# Patient Record
Sex: FEMALE | Race: NATIVE HAWAIIAN OR OTHER PACIFIC ISLANDER | NOT HISPANIC OR LATINO | Employment: FULL TIME | URBAN - NONMETROPOLITAN AREA
[De-identification: names, ages, dates, MRNs, and addresses within clinical notes are randomized per-mention and may not be internally consistent; named-entity substitution may affect disease eponyms.]

---

## 2022-06-10 ENCOUNTER — OFFICE VISIT (OUTPATIENT)
Dept: URGENT CARE | Facility: PHYSICIAN GROUP | Age: 37
End: 2022-06-10

## 2022-06-10 ENCOUNTER — HOSPITAL ENCOUNTER (OUTPATIENT)
Facility: MEDICAL CENTER | Age: 37
End: 2022-06-10
Attending: FAMILY MEDICINE
Payer: COMMERCIAL

## 2022-06-10 VITALS
DIASTOLIC BLOOD PRESSURE: 62 MMHG | HEART RATE: 65 BPM | WEIGHT: 164 LBS | RESPIRATION RATE: 14 BRPM | OXYGEN SATURATION: 99 % | BODY MASS INDEX: 30.96 KG/M2 | SYSTOLIC BLOOD PRESSURE: 118 MMHG | HEIGHT: 61 IN | TEMPERATURE: 98.2 F

## 2022-06-10 DIAGNOSIS — Z11.1 SPECIAL SCREENING EXAMINATION FOR RESPIRATORY TUBERCULOSIS: ICD-10-CM

## 2022-06-10 DIAGNOSIS — Z02.1 PHYSICAL EXAM, PRE-EMPLOYMENT: ICD-10-CM

## 2022-06-10 DIAGNOSIS — Z02.1 PRE-EMPLOYMENT DRUG SCREENING: ICD-10-CM

## 2022-06-10 LAB
AMP AMPHETAMINE: NORMAL
COC COCAINE: NORMAL
INT CON NEG: NORMAL
INT CON POS: NORMAL
MET METHAMPHETAMINES: NORMAL
OPI OPIATES: NORMAL
PCP PHENCYCLIDINE: NORMAL
POC DRUG COMMENT 753798-OCCUPATIONAL HEALTH: NORMAL
THC: NORMAL

## 2022-06-10 PROCEDURE — 86480 TB TEST CELL IMMUN MEASURE: CPT | Performed by: FAMILY MEDICINE

## 2022-06-10 PROCEDURE — 36415 COLL VENOUS BLD VENIPUNCTURE: CPT | Performed by: FAMILY MEDICINE

## 2022-06-10 PROCEDURE — 80305 DRUG TEST PRSMV DIR OPT OBS: CPT | Performed by: FAMILY MEDICINE

## 2022-06-10 PROCEDURE — 8915 PR COMPREHENSIVE PHYSICAL: Performed by: FAMILY MEDICINE

## 2022-06-10 NOTE — PROGRESS NOTES
"Chief Complaint:    Chief Complaint   Patient presents with   • Employment Physical       History of Present Illness:    Here for pre-employment exam.      Past Medical History:    History reviewed. No pertinent past medical history.    Past Surgical History:    History reviewed. No pertinent surgical history.    Social History:    Social History     Socioeconomic History   • Marital status: Single     Spouse name: Not on file   • Number of children: Not on file   • Years of education: Not on file   • Highest education level: Not on file   Occupational History   • Not on file   Tobacco Use   • Smoking status: Not on file   • Smokeless tobacco: Not on file   Substance and Sexual Activity   • Alcohol use: Not on file   • Drug use: Not on file   • Sexual activity: Not on file   Other Topics Concern   • Not on file   Social History Narrative   • Not on file     Social Determinants of Health     Financial Resource Strain: Not on file   Food Insecurity: Not on file   Transportation Needs: Not on file   Physical Activity: Not on file   Stress: Not on file   Social Connections: Not on file   Intimate Partner Violence: Not on file   Housing Stability: Not on file     Family History:    History reviewed. No pertinent family history.    Medications:    No current outpatient medications on file prior to visit.     No current facility-administered medications on file prior to visit.     Allergies:    No Known Allergies      Vitals:    Vitals:    06/10/22 1056   BP: 118/62   Pulse: 65   Resp: 14   Temp: 36.8 °C (98.2 °F)   TempSrc: Temporal   SpO2: 99%   Weight: 74.4 kg (164 lb)   Height: 1.549 m (5' 1\")     Vision: 20/25 right, 20/25 left, 20/20 both, uncorrected.    Able to hear forced whisper in each ear at 5 feet.      Physical Exam:    Constitutional: Vital signs reviewed. Appears well-developed and well-nourished. No acute distress.   Eyes: Sclera white, conjunctivae clear. PERRLA.  ENT: External ears normal. External " auditory canals normal without discharge. TMs translucent and non-bulging. Hearing normal. Nasal mucosa pink. Lips/teeth are normal. Oral mucosa pink and moist. Posterior pharynx: WNL.  Neck: Neck supple.   Cardiovascular: Regular rate and rhythm. No murmur. No edema. No varicosities. Peripheral pulses 2+.  Pulmonary/Chest: Respirations non-labored. Clear to auscultation bilaterally.  Abdomen: Bowel sounds are normal active. Soft, non-distended, and non-tender to palpation. No hepatosplenomegaly. Musculoskeletal: Normal gait. Normal range of motion. No tenderness to palpation. No muscular atrophy or weakness.  Neurological: Alert and oriented to person, place, and time. CN 2-12 intact. Muscle tone normal. Coordination normal. Light touch and sensation normal. Reflexes 2+.  Skin: No rashes or lesions. Warm, dry, normal turgor.  Psychiatric: Normal mood and affect. Behavior is normal. Judgment and thought content normal.         Medical Decision Makin. Special screening examination for respiratory tuberculosis  - Quantiferon Gold Plus TB (4 tube); Future    2. Pre-employment drug screening  - POCT 6 Panel Urine Drug Screen    3. Physical exam, pre-employment      Patient appears to be in a state of good health and is physically able to perform essential functions of the job title above without accommodations.    Form completed.

## 2022-06-13 LAB
GAMMA INTERFERON BACKGROUND BLD IA-ACNC: 1.66 IU/ML
M TB IFN-G BLD-IMP: POSITIVE
M TB IFN-G CD4+ BCKGRND COR BLD-ACNC: 2.58 IU/ML
MITOGEN IGNF BCKGRD COR BLD-ACNC: >10 IU/ML
QFT TB2 - NIL TBQ2: 2.7 IU/ML

## 2022-06-14 ENCOUNTER — APPOINTMENT (OUTPATIENT)
Dept: URGENT CARE | Facility: PHYSICIAN GROUP | Age: 37
End: 2022-06-14
Payer: MEDICAID

## 2022-06-14 DIAGNOSIS — R76.12 POSITIVE QUANTIFERON-TB GOLD TEST: ICD-10-CM

## 2022-06-15 ENCOUNTER — APPOINTMENT (OUTPATIENT)
Dept: RADIOLOGY | Facility: IMAGING CENTER | Age: 37
End: 2022-06-15
Attending: FAMILY MEDICINE

## 2022-06-15 ENCOUNTER — NON-PROVIDER VISIT (OUTPATIENT)
Dept: URGENT CARE | Facility: PHYSICIAN GROUP | Age: 37
End: 2022-06-15
Payer: MEDICAID

## 2022-06-15 ENCOUNTER — APPOINTMENT (OUTPATIENT)
Dept: RADIOLOGY | Facility: IMAGING CENTER | Age: 37
End: 2022-06-15
Attending: FAMILY MEDICINE
Payer: MEDICAID

## 2022-06-15 DIAGNOSIS — R76.12 POSITIVE QUANTIFERON-TB GOLD TEST: ICD-10-CM

## 2022-06-15 PROCEDURE — 71046 X-RAY EXAM CHEST 2 VIEWS: CPT | Mod: TC,FY | Performed by: RADIOLOGY

## 2022-06-29 ENCOUNTER — OFFICE VISIT (OUTPATIENT)
Dept: URGENT CARE | Facility: PHYSICIAN GROUP | Age: 37
End: 2022-06-29
Payer: MEDICAID

## 2022-06-29 VITALS
RESPIRATION RATE: 16 BRPM | BODY MASS INDEX: 30 KG/M2 | TEMPERATURE: 96.8 F | HEART RATE: 76 BPM | HEIGHT: 62 IN | DIASTOLIC BLOOD PRESSURE: 64 MMHG | SYSTOLIC BLOOD PRESSURE: 124 MMHG | OXYGEN SATURATION: 98 % | WEIGHT: 163 LBS

## 2022-06-29 DIAGNOSIS — L98.9 NODULAR LESION ON SURFACE OF SKIN: ICD-10-CM

## 2022-06-29 PROCEDURE — 99214 OFFICE O/P EST MOD 30 MIN: CPT | Performed by: PHYSICIAN ASSISTANT

## 2022-06-29 NOTE — LETTER
June 29, 2022    To Whom It May Concern:         This is confirmation that Julia Anderson attended her scheduled appointment with Christy Cagle P.A.-C. on 6/29/22.  Please excuse patient from work yesterday and today.         If you have any questions please do not hesitate to call me at the phone number listed below.    Sincerely,          Christy Cagle P.A.-C.  [phone]

## 2022-06-29 NOTE — PROGRESS NOTES
"On the rightSubjective:   Julia Anderson is a 36 y.o. female who presents for Breast Mass, Bump (5 lumps on body (3 on breast, 1 on arm and 1 on thigh) has been there 1 month, very painful. Is now effecting pt's sleep. Has not been able to work for 3 days. OTC rx not helping  ), Headache, and Dizziness     Approximately 2 months ago noticed small areas of induation and redness to multiple areas.  She reports to nodular lesions on the left breast, breast, 1 raised tender lesion to the right bicep region and 1 raised tender area to the left medial thigh.  She denies antecedent trauma, scratch, bug bite.  She does not recall any preceding events.  She denies constitutional symptoms but does report pain to these areas.  She has tried heat and ibuprofen without relief.  They sometimes cause her difficulty with sleep.  She denies surrounding redness, fever, chills.  She has no known history of keloid scarring        Medications:  This patient does not have an active medication from one of the medication groupers.    Allergies:             Patient has no known allergies.    Surgical History:       No past surgical history on file.    Past Social Hx:  Julia Anderson       Past Family Hx:   Julia Anderson family history is not on file.       Problem list, medications, and allergies reviewed by myself today in Epic.     Objective:     /64   Pulse 76   Temp 36 °C (96.8 °F) (Temporal)   Resp 16   Ht 1.575 m (5' 2\")   Wt 73.9 kg (163 lb)   SpO2 98%   BMI 29.81 kg/m²     Physical Exam  Vitals and nursing note reviewed.   Constitutional:       General: She is not in acute distress.     Appearance: Normal appearance. She is not ill-appearing or toxic-appearing.   HENT:      Head: Normocephalic.      Right Ear: Tympanic membrane normal.      Left Ear: Tympanic membrane normal.      Nose: Nose normal. No congestion or rhinorrhea.      Mouth/Throat:      Mouth: Mucous membranes are moist.      Pharynx: Oropharynx is clear. No " oropharyngeal exudate or posterior oropharyngeal erythema.   Eyes:      Extraocular Movements: Extraocular movements intact.      Conjunctiva/sclera: Conjunctivae normal.   Cardiovascular:      Rate and Rhythm: Normal rate and regular rhythm.      Pulses: Normal pulses.      Heart sounds: Normal heart sounds.   Pulmonary:      Effort: Pulmonary effort is normal. No tachypnea, respiratory distress or retractions.      Breath sounds: Normal breath sounds. No stridor. No wheezing, rhonchi or rales.   Abdominal:      General: There is no distension.      Palpations: Abdomen is soft.      Tenderness: There is no abdominal tenderness. There is no guarding.   Musculoskeletal:      Cervical back: Normal range of motion.   Lymphadenopathy:      Cervical: No cervical adenopathy.   Skin:     General: Skin is warm.             Comments: Raised, indurated oval lesions noted to the bilateral breast, 2 on the left, 1 on the right.  There is no surrounding cellulitis.  They are mildly tender to touch.  The skin is hyperpigmented.  Similar lesion noted to right medial bicep and left medial thigh, raised, indurated, hyperpigmented.  No surrounding cellulitis.  No signs of infection.   Neurological:      General: No focal deficit present.      Mental Status: She is alert and oriented to person, place, and time.         Assessment/Plan:     Diagnosis and Associated Orders:     1. Nodular lesion on surface of skin  - Referral to Dermatology  - Referral to establish with [facility]CP  - lidocaine (XYLOCAINE) 2% Gel; Apply thin layer to affected area twice daily as needed for pain  Dispense: 30 g; Refill: 0        Comments/MDM:    Patient presents with 5 separate nodular lesions noted to the surface of the skin.  On appearance they appear to be consistent with keloid scar formation however the patient does not recall any antecedent trauma or event leading to their onset.  She has no known history of keloid scar formation.  Discussed  differential diagnosis at length.  Could also be nodular scleroderma.  Recommending referral to dermatology for further evaluation.  May require skin biopsy.  Lidocaine gel prescribed for pain.  Continue ice and heat as needed.  She does not have any constitutional symptoms or lymphadenopathy.  Her vital signs are stable and reassuring.  Does not require higher level of care at this time.  All questions are answered.    I personally reviewed prior external notes and test results pertinent to today's visit.  Red flags discussed as well as indications to present to the Emergency Department.  Supportive care, natural history, differential diagnoses, and indications for immediate follow-up discussed.  Patient expresses understanding and agrees to plan.  Patient denies any other questions or concerns.    Follow-up with the primary care physician for recheck, reevaluation, and consideration of further management.      Please note that this dictation was created using voice recognition software. I have made a reasonable attempt to correct obvious errors, but I expect that there are errors of grammar and possibly content that I did not discover before finalizing the note.    This note was electronically signed by Christy Cagle PA-C

## 2023-04-14 ENCOUNTER — TELEPHONE (OUTPATIENT)
Dept: INTERNAL MEDICINE | Facility: OTHER | Age: 38
End: 2023-04-14
Payer: COMMERCIAL

## 2023-07-20 ENCOUNTER — OFFICE VISIT (OUTPATIENT)
Dept: URGENT CARE | Facility: PHYSICIAN GROUP | Age: 38
End: 2023-07-20
Payer: COMMERCIAL

## 2023-07-20 VITALS
RESPIRATION RATE: 14 BRPM | HEART RATE: 88 BPM | TEMPERATURE: 97.4 F | BODY MASS INDEX: 31.83 KG/M2 | OXYGEN SATURATION: 98 % | WEIGHT: 174 LBS | DIASTOLIC BLOOD PRESSURE: 80 MMHG | SYSTOLIC BLOOD PRESSURE: 110 MMHG

## 2023-07-20 DIAGNOSIS — H81.10 BPPV (BENIGN PAROXYSMAL POSITIONAL VERTIGO), UNSPECIFIED LATERALITY: ICD-10-CM

## 2023-07-20 DIAGNOSIS — R00.2 HEART PALPITATIONS: ICD-10-CM

## 2023-07-20 DIAGNOSIS — R11.0 NAUSEA: ICD-10-CM

## 2023-07-20 PROCEDURE — 3079F DIAST BP 80-89 MM HG: CPT

## 2023-07-20 PROCEDURE — 99214 OFFICE O/P EST MOD 30 MIN: CPT

## 2023-07-20 PROCEDURE — 3074F SYST BP LT 130 MM HG: CPT

## 2023-07-20 PROCEDURE — 93000 ELECTROCARDIOGRAM COMPLETE: CPT

## 2023-07-20 RX ORDER — ONDANSETRON 4 MG/1
4 TABLET, ORALLY DISINTEGRATING ORAL EVERY 6 HOURS PRN
Qty: 15 TABLET | Refills: 0 | Status: SHIPPED | OUTPATIENT
Start: 2023-07-20

## 2023-07-20 RX ORDER — MECLIZINE HCL 12.5 MG/1
12.5-25 TABLET ORAL 3 TIMES DAILY PRN
Qty: 30 TABLET | Refills: 0 | Status: SHIPPED | OUTPATIENT
Start: 2023-07-20 | End: 2023-08-04

## 2023-07-20 RX ORDER — MECLIZINE HCL 12.5 MG/1
12.5-25 TABLET ORAL 3 TIMES DAILY PRN
Qty: 30 TABLET | Refills: 0 | Status: CANCELLED | OUTPATIENT
Start: 2023-07-20 | End: 2023-08-04

## 2023-07-20 RX ORDER — DIAZEPAM 2 MG/1
1-2 TABLET ORAL EVERY 12 HOURS PRN
Qty: 5 TABLET | Refills: 0 | Status: SHIPPED | OUTPATIENT
Start: 2023-07-20 | End: 2023-07-25

## 2023-07-20 ASSESSMENT — ENCOUNTER SYMPTOMS
CHILLS: 0
NAUSEA: 1
ABDOMINAL PAIN: 0
TINGLING: 0
TREMORS: 0
DIAPHORESIS: 0
DIARRHEA: 0
LOSS OF CONSCIOUSNESS: 0
VOMITING: 1
SPEECH CHANGE: 0
HEADACHES: 0
SENSORY CHANGE: 0
SEIZURES: 0
HEARTBURN: 0
FOCAL WEAKNESS: 0
DIZZINESS: 1
FEVER: 0
WEAKNESS: 0
COUGH: 0
WEIGHT LOSS: 0
SINUS PAIN: 0

## 2023-07-20 NOTE — PROGRESS NOTES
Subjective:   Julia Anderson is a very pleasant 37 y.o. female who presents for:    Chief Complaint   Patient presents with    Dizziness       HPI:    The patient presents to the urgent care with complaints of new onset vertigo.  She describes vertigo as feeling as if the room is spinning and describes her symptoms as fairly constant.  Episode onset: three days ago. The problem occurs intermittently and is exacerbated by moving the head from side to side, positional changes from lying to sitting.  Lying down makes the vertigo significantly worse.  Associated symptoms include nausea, bilateral watery eyes, and one episode vomiting due to the spinning sensation. Pertinent negatives include no abdominal pain, syncope, chest pain, headaches, ear fullness, earache, recent URI, new medications, airplane travel, chills, congestion, coughing, diaphoresis, fever, headaches, visual change or weakness.  She endorses that she has never experienced anything like this before.  When she has a particularly severe episode, she endorses mild heart palpitations and anxiety.  Her past medical history is not significant for cardiac conditions, neurological conditions, chronic ear infections.  She does suffer from environmental allergies.Treatments tried: ibuprofen, which she reports is mildly effective       ROS:    Review of Systems   Constitutional:  Negative for chills, diaphoresis, fever, malaise/fatigue and weight loss.   HENT:  Negative for congestion and sinus pain.    Respiratory:  Negative for cough.    Cardiovascular:  Negative for chest pain.   Gastrointestinal:  Positive for nausea and vomiting. Negative for abdominal pain, diarrhea and heartburn.   Neurological:  Positive for dizziness. Negative for tingling, tremors, sensory change, speech change, focal weakness, seizures, loss of consciousness, weakness and headaches.   All other systems reviewed and are negative.      Medications:      Current Outpatient Medications    Medication Sig    lidocaine (XYLOCAINE) 2% Gel Apply thin layer to affected area twice daily as needed for pain (Patient not taking: Reported on 7/20/2023)       Allergies:     No Known Allergies    Problem List:     There is no problem list on file for this patient.      Surgical History:    No past surgical history on file.    Past Social Hx:     Social History     Socioeconomic History    Marital status: Single        Past Family Hx:      No family history on file.    Problem list, medications, and allergies reviewed by myself today in Epic.     Objective:     Vitals:    07/20/23 1245   BP: 110/80   Pulse: 88   Resp: 14   Temp: 36.3 °C (97.4 °F)   SpO2: 98%       Physical Exam  Vitals reviewed.   Constitutional:       General: She is not in acute distress.     Appearance: Normal appearance. She is not ill-appearing, toxic-appearing or diaphoretic.   HENT:      Head: Normocephalic and atraumatic.      Right Ear: Tympanic membrane, ear canal and external ear normal.      Left Ear: Tympanic membrane, ear canal and external ear normal.      Nose: Nose normal.      Mouth/Throat:      Mouth: Mucous membranes are moist.      Pharynx: Oropharynx is clear.   Eyes:      Extraocular Movements: Extraocular movements intact.      Right eye: Nystagmus present. Normal extraocular motion.      Left eye: Nystagmus present. Normal extraocular motion.      Conjunctiva/sclera: Conjunctivae normal.      Right eye: Right conjunctiva is not injected. No chemosis, exudate or hemorrhage.     Left eye: No chemosis, exudate or hemorrhage.     Pupils: Pupils are equal, round, and reactive to light.      Comments: Horizontal nystagmus present during exam.    Cardiovascular:      Rate and Rhythm: Normal rate and regular rhythm.      Pulses: Normal pulses. No decreased pulses.      Heart sounds: Normal heart sounds, S1 normal and S2 normal. Heart sounds not distant. No murmur heard.  Pulmonary:      Effort: Pulmonary effort is normal.       Breath sounds: Normal breath sounds.   Abdominal:      General: Abdomen is flat.      Palpations: Abdomen is soft.   Musculoskeletal:         General: Normal range of motion.      Cervical back: Normal range of motion.      Right lower leg: No edema.      Left lower leg: No edema.   Skin:     General: Skin is warm and dry.   Neurological:      General: No focal deficit present.      Mental Status: She is alert and oriented to person, place, and time.      Cranial Nerves: Cranial nerves 2-12 are intact. No cranial nerve deficit.      Sensory: Sensation is intact. No sensory deficit.      Motor: Motor function is intact. No weakness.      Coordination: Coordination is intact. Coordination normal.      Gait: Gait is intact. Gait normal.   Psychiatric:         Mood and Affect: Mood normal.         Behavior: Behavior normal.         Thought Content: Thought content normal.     EKG: NSR rate: 88, normal axis, normal intervals, no evidence of ischemia or hypertrophy.            Assessment/Plan:     Diagnosis and associated orders:     1. BPPV (benign paroxysmal positional vertigo), unspecified laterality    2. Heart palpitations  - EKG - Clinic Performed    3. Nausea        Comments/MDM:     Based on patient's symptoms, symptom duration, reports of vertigo increasing during movements of the head to the right and the left/sitting up/laying down, horizontal nystagmus present during examination, the likely etiology of the patient's symptoms is due to BPPV  Patient's heart palpitations/shortness of breath that we discussed during physical examination is due to anxiety because of vertigo  EKG in clinic demonstrates normal sinus rhythm with no signs of heart block block, ischemia, hypertrophy  Patient to follow-up with primary care provider  Referral placed to vestibular therapy  Prescriptions for meclizine, diazepam, and ondansetron sent to patient's preferred pharmacy  Patient advised to take meclizine first to determine  symptom improvement, if this is ineffective she may take diazepam 2.5 to 5 mg.  She is educated to start with the lowest possible dose. Reviewed the patient's NARxCHECK Query. Advised the patient to only take this medication at night, as it may cause drowsiness. Instructed the patient not to take the medication while at work, driving, or operating machinery.  Instructed the patient not to drink alcohol while taking this medication.   Symptom management for BPPV discussed with patient, including increased hydration, starting over-the-counter antihistamine, fluticasone, positional maneuvers, such as the Epley and Semont maneuvers         All questions answered. Patient verbalized understanding and is in agreement with this plan of care.     If symptoms are worsening or not improving in 3-5 days, follow-up with PCP or return to UC. Differential diagnosis, natural history, and supportive care discussed. AVS handout given and reviewed with patient. Patient educated on red flags and when to seek treatment back in ED or UC.     I personally reviewed prior external notes and test results pertinent to today's visit.  I have independently reviewed and interpreted all diagnostics ordered during this urgent care visit.     This dictation has been created using voice recognition software. The accuracy of the dictation is limited by the abilities of the software. I expect there may be some errors of grammar and possibly content. I made every attempt to manually correct the errors within my dictation. However, errors related to voice recognition software may still exist and should be interpreted within the appropriate context.    This note was electronically signed by KURTIS Grant